# Patient Record
Sex: FEMALE | Race: WHITE | Employment: OTHER | URBAN - METROPOLITAN AREA
[De-identification: names, ages, dates, MRNs, and addresses within clinical notes are randomized per-mention and may not be internally consistent; named-entity substitution may affect disease eponyms.]

---

## 2018-11-08 ENCOUNTER — OFFICE VISIT (OUTPATIENT)
Dept: OTOLARYNGOLOGY | Facility: OTHER | Age: 73
End: 2018-11-08
Attending: NURSE PRACTITIONER
Payer: MEDICARE

## 2018-11-08 VITALS
SYSTOLIC BLOOD PRESSURE: 150 MMHG | TEMPERATURE: 96.2 F | DIASTOLIC BLOOD PRESSURE: 90 MMHG | BODY MASS INDEX: 27.46 KG/M2 | WEIGHT: 155 LBS | HEIGHT: 63 IN

## 2018-11-08 DIAGNOSIS — H93.11 TINNITUS, RIGHT: ICD-10-CM

## 2018-11-08 DIAGNOSIS — H93.8X1 PLUGGED FEELING IN EAR, RIGHT: ICD-10-CM

## 2018-11-08 DIAGNOSIS — H61.23 BILATERAL IMPACTED CERUMEN: Primary | ICD-10-CM

## 2018-11-08 PROCEDURE — 69210 REMOVE IMPACTED EAR WAX UNI: CPT | Performed by: NURSE PRACTITIONER

## 2018-11-08 PROCEDURE — G0463 HOSPITAL OUTPT CLINIC VISIT: HCPCS

## 2018-11-08 PROCEDURE — 92504 EAR MICROSCOPY EXAMINATION: CPT | Performed by: NURSE PRACTITIONER

## 2018-11-08 PROCEDURE — 99203 OFFICE O/P NEW LOW 30 MIN: CPT | Mod: 25 | Performed by: NURSE PRACTITIONER

## 2018-11-08 RX ORDER — AMLODIPINE BESYLATE 5 MG/1
TABLET ORAL
COMMUNITY
Start: 2018-11-05

## 2018-11-08 RX ORDER — HYDROCHLOROTHIAZIDE 25 MG/1
TABLET ORAL
COMMUNITY
Start: 2018-11-05

## 2018-11-08 RX ORDER — LISINOPRIL 20 MG/1
TABLET ORAL
COMMUNITY
Start: 2018-11-05

## 2018-11-08 RX ORDER — ATENOLOL 100 MG/1
TABLET ORAL
COMMUNITY
Start: 2018-11-05

## 2018-11-08 ASSESSMENT — PAIN SCALES - GENERAL: PAINLEVEL: NO PAIN (0)

## 2018-11-08 NOTE — PATIENT INSTRUCTIONS
Thank you for allowing Tracey Rojas CNP and our ENT team to participate in your care.  If your medications are too expensive, please give the nurse a call.  We can possibly change this medication.  If you have a scheduling or an appointment question please contact [facility] Unit Coordinator at their direct line [phone].   ALL nursing questions or concerns can be directed to your ENT nurse at: [phone] - James    Ears cleaned today. Normal ear examination.    Do not use Q-tips    Recommend annual audiogram.    Follow up in ENT as needed.

## 2018-11-08 NOTE — LETTER
11/8/2018         RE: Alison Pro  [address] [place] [id number]        Dear Colleague,    Thank you for referring your patient, Alison Pro, to the [facility]. Please see a copy of my visit note below.    Otolaryngology Note         Chief Complaint:     Patient presents with:  Ear Problem: EARS plugged-self          History of Present Illness:     Alison Pro is a 73 year old female seen today for concerns regarding plugged right ear. This started end of October.  She did try some OTC debrox, no relief. Muffled hearing right ear. Her ears will pop and will give a temporary improvement. Intermittent right sided tinnitus since this happened. No preceding cold/illness around this time.     Feels her left ear is her normal baseline and is asymptotic.   No otalgia, otorrhea.  Vertigo: denies  There is no facial weakness, facial numbness or dysphagia.  No COM, otologic surgeries or trauma.  No significant noise exposure.  No family history of hearing loss.    Intermittent eye watering, eye doctor thought allergies, OTC antihistamines did not help.     Audiogram: nothing         Medications:     Current Outpatient Rx   Medication Sig Dispense Refill     amLODIPine (NORVASC) 5 MG tablet        atenolol (TENORMIN) 100 MG tablet        hydrochlorothiazide (HYDRODIURIL) 25 MG tablet        lisinopril (PRINIVIL/ZESTRIL) 20 MG tablet               Allergies:     Allergies: Review of patient's allergies indicates no known allergies.          Past Medical History:     No past medical history on file.         Past Surgical History:     No past surgical history on file.    ENT family history reviewed         Social History:     Social History   Substance Use Topics     Smoking status: Not on file     Smokeless tobacco: Not on file     Alcohol use Not on file            Review of Systems:     ROS: See HPI         Physical Exam:     /90 (BP Location: Right arm, Patient Position:  "Chair, Cuff Size: Adult Regular)  Temp 96.2  F (35.7  C) (Tympanic)  Ht 1.588 m (5' 2.5\")  Wt 70.3 kg (155 lb)  BMI 27.9 kg/m2    General - The patient is well nourished and well developed, and appears to have good nutritional status.  Alert and oriented to person and place, answers questions and cooperates with examination appropriately.   Head and Face - Normocephalic and atraumatic, with no gross asymmetry noted.  The facial nerve is intact, with strong symmetric movements.  Voice and Breathing - The patient was breathing comfortably without the use of accessory muscles. There was no wheezing, stridor. The patients voice was clear and strong, and had appropriate pitch and quality.  Ears - External ear normal. The ear canals were examined underneath the operating microscope and with an otologic speculum. The canals were cleaned of all debris with gently suctioning and use of alligator forceps. There is no granulation tissue or sign of cholesteatoma. The patient tolerates this well. TM's are intact without effusion.  Eyes - Extraocular movements intact, and the pupils were reactive to light. Sclera were not icteric or injected, conjunctiva were pink and moist.  Mouth - Examination of the oral cavity showed pink, healthy oral mucosa. Dentition in good condition. No lesions or ulcerations noted. The tongue was mobile and midline.   Throat - The walls of the oropharynx were smooth, pink, moist, symmetric, and had no lesions or ulcerations.  The tonsillar pillars and soft palate were symmetric. The uvula was midline on elevation.    Neck - Normal midline excursion of the laryngotracheal complex during swallowing.  Full range of motion on passive movement.  Palpation of the occipital, submental, submandibular, internal jugular chain, and supraclavicular nodes did not demonstrate any abnormal lymph nodes or masses.  Palpation of the thyroid was soft and smooth, with no nodules or goiter appreciated.  The trachea was " mobile and midline.  Nose - External contour is symmetric, no gross deflection or scars.  Nasal mucosa is pink and moist with no abnormal mucus.  The septum and turbinates were evaluated: unremarkable.  No polyps, masses, or purulence noted on examination.         Assessment and Plan:       ICD-10-CM    1. Bilateral impacted cerumen H61.23    2. Plugged feeling in ear, right H93.8X1    3. Tinnitus, right H93.11      Bilateral ears with complete cerumen impaction.  She noticed hearing improved back to baseline and plugge sensation resolved after debridement.   Reassured normal ear examination otherwise.    Recommend annual audiogram, sooner with any acute hearing changes.  Do not use Q-tips.    Return to ENT as needed.    Tracey Rojas NP  ENT  [facility], [place]  [phone]      Again, thank you for allowing me to participate in the care of your patient.        Sincerely,        Tracey Rojas, ERNIE CNP

## 2018-11-08 NOTE — MR AVS SNAPSHOT
After Visit Summary   11/8/2018    Mrs. Alison Pro    MRN: [id number]           Patient Information     Date Of Birth          1945        Visit Information        Provider Department      11/8/2018 9:00 AM Tracey Rojas APRN CNP [facility]        Care Instructions    Thank you for allowing Tracey Rojas CNP and our ENT team to participate in your care.  If your medications are too expensive, please give the nurse a call.  We can possibly change this medication.  If you have a scheduling or an appointment question please contact [facility] Unit Coordinator at their direct line [phone].   ALL nursing questions or concerns can be directed to your ENT nurse at: [phone] - James    Ears cleaned today. Normal ear examination.    Do not use Q-tips    Recommend annual audiogram.    Follow up in ENT as needed.           Follow-ups after your visit        Follow-up notes from your care team     Return if symptoms worsen or fail to improve.      Who to contact     If you have questions or need follow up information about today's clinic visit or your schedule please contact [facility] directly at [phone].  Normal or non-critical lab and imaging results will be communicated to you by MyChart, letter or phone within 4 business days after the clinic has received the results. If you do not hear from us within 7 days, please contact the clinic through MyChart or phone. If you have a critical or abnormal lab result, we will notify you by phone as soon as possible.  Submit refill requests through [facility] or call your pharmacy and they will forward the refill request to us. Please allow 3 business days for your refill to be completed.          Additional Information About Your Visit        Care EveryWhere ID     This is your Care EveryWhere ID. This could be used by other organizations to access your [facility]  "records  [id number]        Your Vitals Were     Temperature Height BMI (Body Mass Index)             96.2  F (35.7  C) (Tympanic) 1.588 m (5' 2.5\") 27.9 kg/m2          Blood Pressure from Last 3 Encounters:   11/08/18 150/90    Weight from Last 3 Encounters:   11/08/18 70.3 kg (155 lb)              Today, you had the following     No orders found for display       Primary Care Provider Office Phone # Fax #    Michelle MARIO [place] [phone]       [facility] [address] [facility] [address]        Equal Access to Services     [facility] Center[facility] : Hadii reshma titus Soperri, waseanda lufriedaadaha, qaybta kaalmada adejamshidda, jeet partida . So [facility] [phone].    ATENCIÓN: Si habla español, tiene a santa disposición servicios gratuitos de asistencia lingüística. Ll[facility]leveland [facility] [phone].    We comply with applicable federal civil rights laws and [place] laws. We do not discriminate on the basis of race, color, national origin, age, disability, sex, sexual orientation, or gender identity.            Thank you!     Thank you for choosing [facility]  for your care. Our goal is always to provide you with excellent care. Hearing back from our patients is one way we can continue to improve our services. Please take a few minutes to complete the written survey that you may receive in the mail after your visit with us. Thank you!             Your Updated Medication List - Protect others around you: Learn how to safely use, store and throw away your medicines at www.disposemymeds.org.          This list is accurate as of 11/8/18  9:23 AM.  Always use your most recent med list.                   Brand Name Dispense Instructions for use Diagnosis    amLODIPine 5 MG tablet    NORVASC          atenolol 100 MG tablet    TENORMIN          hydrochlorothiazide 25 MG tablet    HYDRODIURIL          lisinopril 20 MG tablet    PRINIVIL/ZESTRIL            "

## 2018-11-08 NOTE — NURSING NOTE
"Chief Complaint   Patient presents with     Ear Problem     EARS plugged-self        Initial /90 (BP Location: Right arm, Patient Position: Chair, Cuff Size: Adult Regular)  Temp 96.2  F (35.7  C) (Tympanic)  Ht 1.588 m (5' 2.5\")  Wt 70.3 kg (155 lb)  BMI 27.9 kg/m2 Estimated body mass index is 27.9 kg/(m^2) as calculated from the following:    Height as of this encounter: 1.588 m (5' 2.5\").    Weight as of this encounter: 70.3 kg (155 lb).  Medication Reconciliation: complete    Christina Spence LPN    "

## 2018-11-08 NOTE — PROGRESS NOTES
"Otolaryngology Note         Chief Complaint:     Patient presents with:  Ear Problem: EARS plugged-self          History of Present Illness:     Alison Pro is a 73 year old female seen today for concerns regarding plugged right ear. This started end of October.  She did try some OTC debrox, no relief. Muffled hearing right ear. Her ears will pop and will give a temporary improvement. Intermittent right sided tinnitus since this happened. No preceding cold/illness around this time.     Feels her left ear is her normal baseline and is asymptotic.   No otalgia, otorrhea.  Vertigo: denies  There is no facial weakness, facial numbness or dysphagia.  No COM, otologic surgeries or trauma.  No significant noise exposure.  No family history of hearing loss.    Intermittent eye watering, eye doctor thought allergies, OTC antihistamines did not help.     Audiogram: nothing         Medications:     Current Outpatient Rx   Medication Sig Dispense Refill     amLODIPine (NORVASC) 5 MG tablet        atenolol (TENORMIN) 100 MG tablet        hydrochlorothiazide (HYDRODIURIL) 25 MG tablet        lisinopril (PRINIVIL/ZESTRIL) 20 MG tablet               Allergies:     Allergies: Review of patient's allergies indicates no known allergies.          Past Medical History:     No past medical history on file.         Past Surgical History:     No past surgical history on file.    ENT family history reviewed         Social History:     Social History   Substance Use Topics     Smoking status: Not on file     Smokeless tobacco: Not on file     Alcohol use Not on file            Review of Systems:     ROS: See HPI         Physical Exam:     /90 (BP Location: Right arm, Patient Position: Chair, Cuff Size: Adult Regular)  Temp 96.2  F (35.7  C) (Tympanic)  Ht 1.588 m (5' 2.5\")  Wt 70.3 kg (155 lb)  BMI 27.9 kg/m2    General - The patient is well nourished and well developed, and appears to have good nutritional status.  Alert and " oriented to person and place, answers questions and cooperates with examination appropriately.   Head and Face - Normocephalic and atraumatic, with no gross asymmetry noted.  The facial nerve is intact, with strong symmetric movements.  Voice and Breathing - The patient was breathing comfortably without the use of accessory muscles. There was no wheezing, stridor. The patients voice was clear and strong, and had appropriate pitch and quality.  Ears - External ear normal. The ear canals were examined underneath the operating microscope and with an otologic speculum. The canals were cleaned of all debris with gently suctioning and use of alligator forceps. There is no granulation tissue or sign of cholesteatoma. The patient tolerates this well. TM's are intact without effusion.  Eyes - Extraocular movements intact, and the pupils were reactive to light. Sclera were not icteric or injected, conjunctiva were pink and moist.  Mouth - Examination of the oral cavity showed pink, healthy oral mucosa. Dentition in good condition. No lesions or ulcerations noted. The tongue was mobile and midline.   Throat - The walls of the oropharynx were smooth, pink, moist, symmetric, and had no lesions or ulcerations.  The tonsillar pillars and soft palate were symmetric. The uvula was midline on elevation.    Neck - Normal midline excursion of the laryngotracheal complex during swallowing.  Full range of motion on passive movement.  Palpation of the occipital, submental, submandibular, internal jugular chain, and supraclavicular nodes did not demonstrate any abnormal lymph nodes or masses.  Palpation of the thyroid was soft and smooth, with no nodules or goiter appreciated.  The trachea was mobile and midline.  Nose - External contour is symmetric, no gross deflection or scars.  Nasal mucosa is pink and moist with no abnormal mucus.  The septum and turbinates were evaluated: unremarkable.  No polyps, masses, or purulence noted on  examination.         Assessment and Plan:       ICD-10-CM    1. Bilateral impacted cerumen H61.23    2. Plugged feeling in ear, right H93.8X1    3. Tinnitus, right H93.11      Bilateral ears with complete cerumen impaction.  She noticed hearing improved back to baseline and plugge sensation resolved after debridement.   Reassured normal ear examination otherwise.    Recommend annual audiogram, sooner with any acute hearing changes.  Do not use Q-tips.    Return to ENT as needed.    Tracey Rojas NP  ENT  [facility], [place]  [phone]